# Patient Record
Sex: MALE | Race: WHITE | NOT HISPANIC OR LATINO | URBAN - METROPOLITAN AREA
[De-identification: names, ages, dates, MRNs, and addresses within clinical notes are randomized per-mention and may not be internally consistent; named-entity substitution may affect disease eponyms.]

---

## 2017-01-11 ENCOUNTER — OFFICE (OUTPATIENT)
Dept: URBAN - METROPOLITAN AREA CLINIC 19 | Facility: CLINIC | Age: 77
End: 2017-01-11

## 2017-01-11 PROCEDURE — G9199 DOC REASON FOR NO VTE: HCPCS

## 2017-07-06 ENCOUNTER — OFFICE (OUTPATIENT)
Dept: URBAN - METROPOLITAN AREA CLINIC 14 | Facility: CLINIC | Age: 77
End: 2017-07-06
Payer: COMMERCIAL

## 2017-07-06 VITALS
HEART RATE: 65 BPM | SYSTOLIC BLOOD PRESSURE: 127 MMHG | WEIGHT: 206 LBS | DIASTOLIC BLOOD PRESSURE: 75 MMHG | HEIGHT: 75 IN

## 2017-07-06 DIAGNOSIS — Z80.0 FAMILY HISTORY OF MALIGNANT NEOPLASM OF DIGESTIVE ORGANS: ICD-10-CM

## 2017-07-06 DIAGNOSIS — I10 ESSENTIAL (PRIMARY) HYPERTENSION: ICD-10-CM

## 2017-07-06 DIAGNOSIS — I25.10 ATHEROSCLEROTIC HEART DISEASE OF NATIVE CORONARY ARTERY WITH: ICD-10-CM

## 2017-07-06 DIAGNOSIS — Z79.01 LONG TERM (CURRENT) USE OF ANTICOAGULANTS: ICD-10-CM

## 2017-07-06 PROCEDURE — G8427 DOCREV CUR MEDS BY ELIG CLIN: HCPCS | Performed by: INTERNAL MEDICINE

## 2017-07-06 PROCEDURE — 99213 OFFICE O/P EST LOW 20 MIN: CPT | Performed by: INTERNAL MEDICINE

## 2017-07-06 NOTE — SERVICEHPINOTES
Mr. Early Is a 76-year-old man referred from the [facility] for evaluation for pancreatic cancer screening. The patient has a family history of 2 first-degree relatives with pancreatic cancer, namely his mother when she was 79 and his sister when she was 73. There are no other family members with pancreatic cancer. He states that he underwent genetic testing at the [facility] that came back negative for any known pancreatic cancer genetic syndromes. He denies any fevers, chills, nausea, vomiting, abdominal pain, diarrhea, constipation, or jaundice. He has had some mild weight loss since a hernia repair surgery but overall is doing stable. His last colonoscopy was in April 2016 with no polyps removed. His next colonoscopy is due in 2026.

## 2017-07-06 NOTE — SERVICENOTES
Given the patient has 2 first-degree relatives with pancreatic cancer, he does have an increased risk of pancreatic cancer and so based on International cancer of the pancreas screening cancer consortium guidelines, pancreatic cancer screening is recommended.  We did discuss screening either by MRI versus endoscopic ultrasound, including the risks and benefits of each.  At this time the patient would like to undergo pancreas protocol MRI.  If there are any suspicious finding he will then undergo EUS.  We will then follow-up with annual screening alternating between EUS and MRI.  If screening exams are negative in the next few years we can consider making screening less frequent or foregoing altogether.

## 2018-07-12 ENCOUNTER — OFFICE (OUTPATIENT)
Dept: URBAN - METROPOLITAN AREA CLINIC 14 | Facility: CLINIC | Age: 78
End: 2018-07-12
Payer: MEDICARE

## 2018-07-12 VITALS
DIASTOLIC BLOOD PRESSURE: 69 MMHG | WEIGHT: 207 LBS | HEIGHT: 75 IN | HEART RATE: 57 BPM | SYSTOLIC BLOOD PRESSURE: 130 MMHG

## 2018-07-12 DIAGNOSIS — I25.10 ATHEROSCLEROTIC HEART DISEASE OF NATIVE CORONARY ARTERY WITH: ICD-10-CM

## 2018-07-12 DIAGNOSIS — I10 ESSENTIAL (PRIMARY) HYPERTENSION: ICD-10-CM

## 2018-07-12 DIAGNOSIS — Z80.0 FAMILY HISTORY OF MALIGNANT NEOPLASM OF DIGESTIVE ORGANS: ICD-10-CM

## 2018-07-12 DIAGNOSIS — Z79.01 LONG TERM (CURRENT) USE OF ANTICOAGULANTS: ICD-10-CM

## 2018-07-12 PROCEDURE — 99213 OFFICE O/P EST LOW 20 MIN: CPT | Performed by: INTERNAL MEDICINE

## 2018-07-12 NOTE — SERVICENOTES
Since he had some imaging last year we will proceed with endoscopic ultrasound this year and then alternate with MRI for next year pending no lesions being seen. MRI will have to be with contrast with both MRCP and pancreas protocol.  If he does well until the age of 81 we can consider making his screening less frequent or possibly even foregoing any further screening.

## 2018-07-12 NOTE — SERVICEHPINOTES
Mr. Early Is a 77-year-old man referred from the [facility] for pancreatic cancer screening. The patient has a family history of 2 first-degree relatives with pancreatic cancer, namely his mother when she was 79 and his sister when she was 73. There are no other family members with pancreatic cancer. He states that he underwent genetic testing at the [facility] that came back negative for any known pancreatic cancer genetic syndromes. His last colonoscopy was in April 2016 with no polyps removed. His next colonoscopy is due in 2026.I initially saw him last year for this and at that time I recommended alternating MRI evaluation with EUS evaluation. Unfortunately, the patient never followed through with the MRI that we had ordered.. He did have a CT scan about that same time that was negative for any pancreatic mass or lesion but was not pancreatic protocol. He was lost to follow-up and now comes back in today. He states overall he has been doing well. He denies any fevers, chills, nausea vomiting, abdominal pain, diarrhea, constipation, or rectal bleeding.

## 2019-07-18 ENCOUNTER — OFFICE (OUTPATIENT)
Dept: URBAN - METROPOLITAN AREA CLINIC 14 | Facility: CLINIC | Age: 79
End: 2019-07-18
Payer: COMMERCIAL

## 2019-07-18 VITALS
DIASTOLIC BLOOD PRESSURE: 69 MMHG | SYSTOLIC BLOOD PRESSURE: 130 MMHG | WEIGHT: 213 LBS | HEART RATE: 70 BPM | HEIGHT: 75 IN

## 2019-07-18 DIAGNOSIS — Z80.0 FAMILY HISTORY OF MALIGNANT NEOPLASM OF DIGESTIVE ORGANS: ICD-10-CM

## 2019-07-18 DIAGNOSIS — I25.10 ATHEROSCLEROTIC HEART DISEASE OF NATIVE CORONARY ARTERY WITH: ICD-10-CM

## 2019-07-18 DIAGNOSIS — Z79.01 LONG TERM (CURRENT) USE OF ANTICOAGULANTS: ICD-10-CM

## 2019-07-18 PROCEDURE — 99213 OFFICE O/P EST LOW 20 MIN: CPT | Performed by: INTERNAL MEDICINE

## 2019-07-18 NOTE — SERVICENOTES
Overall the patient feels very well. He has been noncompliant with our recommended screening for his strong family history of pancreatic cancer.  We initially scheduled for MRI which he had never followed through.  We then recommended EUS last year but he never followed through.  He states that he is now willing to undergo MRI evaluation but is no longer interested in any invasive EUS evaluation.  I explained him that we can do annual MRI/MRCP.  He states that he would like to do this until the age of 81 and then forego any further surveillance or at least make less frequent.  I did explain to him that if there are any abnormalities found on MRI then I would recommend EUS evaluation, which he agrees to.

## 2019-07-18 NOTE — SERVICEHPINOTES
Mr. Early is a 78-year-old man referred from the [facility] for pancreatic cancer screening. The patient has a family history of 2 first-degree relatives with pancreatic cancer, namely his mother when she was 79 and his sister when she was 73. There are no other family members with pancreatic cancer. He states that he underwent genetic testing at the [facility] that came back negative for any known pancreatic cancer genetic syndromes. His last colonoscopy was in April 2016 with no polyps removed. His next colonoscopy is due in 2026. I initially saw him in 2017 for this and at that time I recommended alternating MRI evaluation alternating with EUS evaluation. Unfortunately, the patient never followed through with the MRI that we had ordered. He did have a CT scan about that same time in 2017 that was negative for any pancreatic mass or lesion but was not pancreatic protocol. He was lost to follow-up but came back in 2018. At that time we decided to proceed with EUS evaluation, however after obtaining cardiac clearance, the patient decided that he did not want to follow through with the EUS due to health issues with his wife as well as his own health issues with prostate cancer, etc.  Interim history:BRThe patient comes in for follow-up today stating that overall he is doing very well.  He denies any fevers, chills, nausea, vomiting, abdominal pain, diarrhea, constipation, or rectal bleeding.  He denies any jaundice or itching.  He states that he has been doing well from a health standpoint.  He is no longer interested in EUS but would rather have MRI evaluation.BR

## 2019-08-15 ENCOUNTER — AMBULATORY SURGICAL CENTER (OUTPATIENT)
Dept: URBAN - METROPOLITAN AREA SURGERY 2 | Facility: SURGERY | Age: 79
End: 2019-08-15
Payer: COMMERCIAL

## 2019-08-15 ENCOUNTER — OFFICE (OUTPATIENT)
Dept: URBAN - METROPOLITAN AREA PATHOLOGY 22 | Facility: PATHOLOGY | Age: 79
End: 2019-08-15
Payer: COMMERCIAL

## 2019-08-15 VITALS
DIASTOLIC BLOOD PRESSURE: 54 MMHG | DIASTOLIC BLOOD PRESSURE: 64 MMHG | RESPIRATION RATE: 16 BRPM | HEART RATE: 64 BPM | SYSTOLIC BLOOD PRESSURE: 119 MMHG | HEIGHT: 75 IN | HEIGHT: 75 IN | OXYGEN SATURATION: 99 % | SYSTOLIC BLOOD PRESSURE: 119 MMHG | RESPIRATION RATE: 18 BRPM | HEART RATE: 57 BPM | TEMPERATURE: 98.3 F | DIASTOLIC BLOOD PRESSURE: 64 MMHG | HEART RATE: 64 BPM | HEART RATE: 61 BPM | TEMPERATURE: 98.3 F | SYSTOLIC BLOOD PRESSURE: 108 MMHG | WEIGHT: 206 LBS | SYSTOLIC BLOOD PRESSURE: 108 MMHG | DIASTOLIC BLOOD PRESSURE: 51 MMHG | RESPIRATION RATE: 18 BRPM | SYSTOLIC BLOOD PRESSURE: 119 MMHG | RESPIRATION RATE: 18 BRPM | TEMPERATURE: 97.5 F | TEMPERATURE: 97.5 F | DIASTOLIC BLOOD PRESSURE: 54 MMHG | OXYGEN SATURATION: 99 % | DIASTOLIC BLOOD PRESSURE: 60 MMHG | WEIGHT: 206 LBS | SYSTOLIC BLOOD PRESSURE: 121 MMHG | HEART RATE: 57 BPM | HEIGHT: 75 IN | SYSTOLIC BLOOD PRESSURE: 105 MMHG | SYSTOLIC BLOOD PRESSURE: 105 MMHG | SYSTOLIC BLOOD PRESSURE: 108 MMHG | SYSTOLIC BLOOD PRESSURE: 121 MMHG | TEMPERATURE: 98.3 F | DIASTOLIC BLOOD PRESSURE: 60 MMHG | SYSTOLIC BLOOD PRESSURE: 105 MMHG | DIASTOLIC BLOOD PRESSURE: 64 MMHG | TEMPERATURE: 97.5 F | HEART RATE: 64 BPM | DIASTOLIC BLOOD PRESSURE: 51 MMHG | DIASTOLIC BLOOD PRESSURE: 54 MMHG | HEART RATE: 61 BPM | WEIGHT: 206 LBS | HEART RATE: 61 BPM | RESPIRATION RATE: 16 BRPM | HEART RATE: 57 BPM | OXYGEN SATURATION: 99 % | RESPIRATION RATE: 16 BRPM | SYSTOLIC BLOOD PRESSURE: 121 MMHG | DIASTOLIC BLOOD PRESSURE: 51 MMHG | DIASTOLIC BLOOD PRESSURE: 60 MMHG

## 2019-08-15 DIAGNOSIS — D12.2 BENIGN NEOPLASM OF ASCENDING COLON: ICD-10-CM

## 2019-08-15 DIAGNOSIS — D12.3 BENIGN NEOPLASM OF TRANSVERSE COLON: ICD-10-CM

## 2019-08-15 DIAGNOSIS — K64.1 SECOND DEGREE HEMORRHOIDS: ICD-10-CM

## 2019-08-15 DIAGNOSIS — K63.5 POLYP OF COLON: ICD-10-CM

## 2019-08-15 DIAGNOSIS — D12.4 BENIGN NEOPLASM OF DESCENDING COLON: ICD-10-CM

## 2019-08-15 DIAGNOSIS — R93.3 ABNORMAL FINDINGS ON DIAGNOSTIC IMAGING OF OTHER PARTS OF DI: ICD-10-CM

## 2019-08-15 PROCEDURE — 45385 COLONOSCOPY W/LESION REMOVAL: CPT | Performed by: INTERNAL MEDICINE

## 2019-08-15 PROCEDURE — G8918 PT W/O PREOP ORDER IV AB PRO: HCPCS | Performed by: INTERNAL MEDICINE

## 2019-08-15 PROCEDURE — G8907 PT DOC NO EVENTS ON DISCHARG: HCPCS | Performed by: INTERNAL MEDICINE

## 2019-08-15 PROCEDURE — 45380 COLONOSCOPY AND BIOPSY: CPT | Mod: 59 | Performed by: INTERNAL MEDICINE

## 2019-08-15 PROCEDURE — 88305 TISSUE EXAM BY PATHOLOGIST: CPT | Performed by: INTERNAL MEDICINE

## 2022-05-18 ENCOUNTER — INPATIENT HOSPITAL (OUTPATIENT)
Dept: URBAN - METROPOLITAN AREA HOSPITAL 95 | Facility: HOSPITAL | Age: 82
End: 2022-05-18

## 2022-05-18 DIAGNOSIS — R74.01 ELEVATION OF LEVELS OF LIVER TRANSAMINASE LEVELS: ICD-10-CM

## 2022-05-18 PROCEDURE — 99222 1ST HOSP IP/OBS MODERATE 55: CPT | Mod: FS | Performed by: NURSE PRACTITIONER

## 2022-05-19 ENCOUNTER — INPATIENT HOSPITAL (OUTPATIENT)
Dept: URBAN - METROPOLITAN AREA HOSPITAL 95 | Facility: HOSPITAL | Age: 82
End: 2022-05-19

## 2022-05-19 DIAGNOSIS — R74.01 ELEVATION OF LEVELS OF LIVER TRANSAMINASE LEVELS: ICD-10-CM

## 2022-05-19 PROCEDURE — 99231 SBSQ HOSP IP/OBS SF/LOW 25: CPT | Performed by: SPECIALIST

## 2023-05-02 ENCOUNTER — OFFICE (OUTPATIENT)
Dept: URBAN - METROPOLITAN AREA CLINIC 11 | Facility: CLINIC | Age: 83
End: 2023-05-02

## 2023-05-02 VITALS
DIASTOLIC BLOOD PRESSURE: 85 MMHG | SYSTOLIC BLOOD PRESSURE: 151 MMHG | OXYGEN SATURATION: 98 % | WEIGHT: 211 LBS | HEART RATE: 77 BPM | HEIGHT: 75 IN

## 2023-05-02 DIAGNOSIS — R10.84 GENERALIZED ABDOMINAL PAIN: ICD-10-CM

## 2023-05-02 DIAGNOSIS — R11.2 NAUSEA WITH VOMITING, UNSPECIFIED: ICD-10-CM

## 2023-05-02 DIAGNOSIS — Z86.010 PERSONAL HISTORY OF COLONIC POLYPS: ICD-10-CM

## 2023-05-02 DIAGNOSIS — K56.0 PARALYTIC ILEUS: ICD-10-CM

## 2023-05-02 PROCEDURE — 99214 OFFICE O/P EST MOD 30 MIN: CPT | Performed by: INTERNAL MEDICINE

## 2023-05-03 ENCOUNTER — INPATIENT HOSPITAL (OUTPATIENT)
Dept: URBAN - METROPOLITAN AREA HOSPITAL 95 | Facility: HOSPITAL | Age: 83
End: 2023-05-03

## 2023-05-03 DIAGNOSIS — F11.20 OPIOID DEPENDENCE, UNCOMPLICATED: ICD-10-CM

## 2023-05-03 DIAGNOSIS — K56.0 PARALYTIC ILEUS: ICD-10-CM

## 2023-05-03 DIAGNOSIS — R74.01 ELEVATION OF LEVELS OF LIVER TRANSAMINASE LEVELS: ICD-10-CM

## 2023-05-03 PROCEDURE — 99222 1ST HOSP IP/OBS MODERATE 55: CPT | Mod: SA | Performed by: NURSE PRACTITIONER

## 2023-05-04 ENCOUNTER — ON CAMPUS - OUTPATIENT (OUTPATIENT)
Dept: URBAN - METROPOLITAN AREA HOSPITAL 95 | Facility: HOSPITAL | Age: 83
End: 2023-05-04

## 2023-05-04 DIAGNOSIS — R74.01 ELEVATION OF LEVELS OF LIVER TRANSAMINASE LEVELS: ICD-10-CM

## 2023-05-04 PROCEDURE — 99232 SBSQ HOSP IP/OBS MODERATE 35: CPT | Performed by: INTERNAL MEDICINE

## 2023-05-05 ENCOUNTER — ON CAMPUS - OUTPATIENT (OUTPATIENT)
Dept: URBAN - METROPOLITAN AREA HOSPITAL 95 | Facility: HOSPITAL | Age: 83
End: 2023-05-05

## 2023-05-05 DIAGNOSIS — R74.01 ELEVATION OF LEVELS OF LIVER TRANSAMINASE LEVELS: ICD-10-CM

## 2023-05-05 DIAGNOSIS — F11.20 OPIOID DEPENDENCE, UNCOMPLICATED: ICD-10-CM

## 2023-05-05 DIAGNOSIS — K56.0 PARALYTIC ILEUS: ICD-10-CM

## 2023-05-05 PROCEDURE — 99232 SBSQ HOSP IP/OBS MODERATE 35: CPT | Performed by: INTERNAL MEDICINE
